# Patient Record
(demographics unavailable — no encounter records)

---

## 2024-10-15 NOTE — HISTORY OF PRESENT ILLNESS
[FreeTextEntry1] : 53-year-old female active smoker with multiple medical problems including congestive heart failure, coronary artery disease, diabetes, morbid obesity with a long history of peripheral vascular disease s/p removal of infected graft and multiple interventions at prior facility who presents to the office for bilateral lower extremity wounds. Patient has completed several courses of antibiotics. Patient reports significant rest pain. She has been seeing wound care weekly.  Patient also has a draining abscess on the right thigh.  This has been present since the graft was removed at [facility].  She states there is still drainage.  Denies fever.

## 2024-10-15 NOTE — HISTORY OF PRESENT ILLNESS
[FreeTextEntry1] : 53-year-old female active smoker with multiple medical problems including congestive heart failure, coronary artery disease, diabetes, morbid obesity with a long history of peripheral vascular disease s/p removal of infected graft and multiple interventions at prior facility who presents to the office for bilateral lower extremity wounds. Patient has completed several courses of antibiotics. Patient reports significant rest pain. She has been seeing wound care weekly.  Patient also has a draining abscess on the right thigh.  This has been present since the graft was removed at [facility].  She states there is still drainage.  Denies fever.

## 2024-10-15 NOTE — ASSESSMENT
[Arterial/Venous Disease] : arterial/venous disease [Medication Management] : medication management [Smoking Cessation] : smoking cessation [Ulcer Care] : ulcer care [FreeTextEntry1] : 53-year-old female with a very complicated history including congestive heart failure coronary artery disease, diabetes, morbid obesity with a long history of peripheral vascular disease and she is status post removal of multiple stents.  In the office she underwent a duplex study which does show continued area of infection that tracks to the level of an old bypass graft.  Given these findings patient will need to have an I&D with removal of the infected graft.  This was discussed with the patient at length.

## 2024-10-15 NOTE — PHYSICAL EXAM
[Normal Breath Sounds] : Normal breath sounds [Normal Rate and Rhythm] : normal rate and rhythm [2+] : left 2+ [1+] : left 1+ [0] : left 0 [Ankle Swelling (On Exam)] : present [Ankle Swelling Bilaterally] : severe [Skin Ulcer] : ulcer [Alert] : alert [Calm] : calm [JVD] : no jugular venous distention  [de-identified] : Appears well, elevated BMI [de-identified] : wound dehiscence right thigh, right heel ulcer 7.4 x 4 cm, left heel ulcer 2 x 1.5 cm

## 2024-10-15 NOTE — PHYSICAL EXAM
[Normal Breath Sounds] : Normal breath sounds [Normal Rate and Rhythm] : normal rate and rhythm [2+] : left 2+ [1+] : left 1+ [0] : left 0 [Ankle Swelling (On Exam)] : present [Ankle Swelling Bilaterally] : severe [Skin Ulcer] : ulcer [Alert] : alert [Calm] : calm [JVD] : no jugular venous distention  [de-identified] : Appears well, elevated BMI [de-identified] : wound dehiscence right thigh, right heel ulcer 7.4 x 4 cm, left heel ulcer 2 x 1.5 cm

## 2024-10-21 NOTE — PHYSICAL EXAM
[0] : left 0 [Ankle Swelling (On Exam)] : present [Ankle Swelling Bilaterally] : severe [Please See PDF for [employer]] : Please See PDF for [employer].

## 2024-10-28 NOTE — HISTORY OF PRESENT ILLNESS
[FreeTextEntry1] : 53F with PMH of HF, CAD, and PVD seen for follow up management of bilateral foot ulcers. She has been applying Santyl and Aquacell to her bilateral foot wounds. PT states she has a nicotine patch to try and stop smoking but they caused a rash and stopped wearing them.   FBS 10/21: 165 mg/dl  7/8/24 HbA1c: 7.9%

## 2024-10-28 NOTE — ASSESSMENT
[FreeTextEntry1] : 53F with R dorsolateral foot wound and b/l heel wounds  - Pt assessed and chart reviewed  - Right foot [id number] open with granular wound bed, clear serous no drainage, periwound erythema, s/p 6/10 right heel; Apligraft application, right leg anterior leg wound was dried sanguineous blood, no signs of infection, b/l healed heel wounds, no signs of acute infection - Using sterile #15 blade, right foot dorsolateral foot wound was debrided to the level of subQ and not beyond, pt tolerated procedure well - Applied Santyl followed by followed by DSD to dorsolateral foot wound and Mupirocin to the right anterior leg wound - Educated pt on importance of smoking cessation for proper wound healing  - RTC 3 weeks

## 2025-04-03 NOTE — REASON FOR VISIT
[Cardiac Failure] : cardiac failure [Other: ____] : [unfilled] [FreeTextEntry1] : Card: Dr. Mariel Ma\par  PCP: Dr. Leno Steinberg\par  Pulmonologist: Dr. Jameel Laughlin\par  Podiatrist Dr. salena Kessler

## 2025-04-03 NOTE — HISTORY OF PRESENT ILLNESS
[FreeTextEntry1] : Ms. Camara is a 54 year old woman with HFpEF (LVIDd 5cm, LVEF 62%), CAD s/p PCIs (2015) with subsequent 1v CABG (2016 at [facility]), HTN, PVD s/p RLE stent in 2020 with subsequent RLE digits amputation, RLE DVT (in 2021, on Xarelto), HLD, DMT2 (Ha1c 8.3%), COPD, MARY ELLEN on CPAP and morbid obesity (BMI 43) who is an active smoker. She presents today for follow up  She was hospitalized from 10/18-10/30/23 at [facility] sent from HF office for volume overload. She was diuresis with IV Bumex for 4 days before transitioning to PO torsemide and on her GDMTs. Pt was also being treated with IV antibiotics for RLE infection complicated by septic arthritis/OM of R 3rd MTP joint. Pt was discharged on 10/30/23 with spironolactone 25mg QD, Toprol XL 50mg QD, torsemide 40mg QD and lisinopril 2.5mg QD and standing weight of 281lb.   Of note, given significant PAD decision made to d/c Jardiance. Not currently following Dr. Crowell as she was dismissed from her office.   Last visit with HF was 5/2024. Took 1 metalozone and had good UOP. Had seen vascular in interim and not a surgical/endovascular candidate at this time given ongoing vascular disease  She presents today endorsing more RLE > LLE swelling and abdominal discomfort. She doesn't walk much and primarily depends on her motor scooter to navigate around. She said she is taking torsemide 60mg BID and sometimes endorses good and sometimes not so much urine output. She states she still eats junk/salty food and actively smokes cigarettes.  Otherwise, denies any CP/palpitation, LH/dizziness, orthopnea or PND.

## 2025-04-03 NOTE — PHYSICAL EXAM
[No Acute Distress] : no acute distress [Normal Conjunctiva] : normal conjunctiva [Soft] : abdomen soft [Non Tender] : non-tender [Normal Bowel Sounds] : normal bowel sounds [No Rash] : no rash [Normal] : moves all extremities, no focal deficits, normal speech [Alert and Oriented] : alert and oriented [de-identified] : obese [de-identified] : partially edentulous [de-identified] : difficult to assess given body habitus and patient unable to get out of motorized wheelchair but appears about ~10cm H2O [de-identified] : expiratory wheeze throughout, unlabored [de-identified] : using electric wheelchair [de-identified] : + 2 RLE edema and trace to LLE. [de-identified] : warm peripherally, dressing to RLE

## 2025-04-03 NOTE — ASSESSMENT
[FreeTextEntry1] : 53 year old F with HFpEF, CAD s/p PCIs (2015) with subsequent 1v CABG (2016 at [facility]), HTN, PVD s/p RLE stent in 2020 with subsequent RLE digits amputation, PAD noted on recent CTA 3/2024, RLE DVT (in 2021, on Xarelto), HLD, DMT2 (Ha1c 8.3%), COPD, MARY ELLEN, morbid obesity (BMI 43) and active smoker who presents today for follow up post hospital discharge. She's currently ACC/AHA Stage C, NYHA Class IIIb, mildly volume up, normotensive on exam.   #Chronic diastolic heart failure - Will continue torsemide to 60mg BID. will add on metalozone 2.5mg weekly to help augment diuresis and maintain eventual euvolemia - Continue spironolactone 50 daily - SGLT2i deferred given PAD - Discussed CardioMEMs, however, currently not an ideal candidate given open wounds, and prior issues with compliance and being able to contact patient. Could reconsider in the future once wounds are healed and patient has demonstrated regular follow up with HF team.  - encouraged daily weights as best able - reeducated on importance of low sodium diet. 1L fluid restriction -encourage to cessation of smoking but she has no interest  # CAD - with known  of RCA and dCx but with good collaterals - no signs of ischemia - on ASA and statin  # MARY ELLEN - currently untreated as has not used CPAP due to anxiety - untreated MARY ELLEN may be contributing to fatigue - continue to follow with pulmonology Dr. Laughlin  # RLE PVD - During hospitalization - treated w/ IV antibiotics for RLE infection complicated by septic arthritis/OM of R 3rd MTP joint - Follow up with vascular and wound care; Urged importance of reaching out to wound care team for further management.   #anxiety and reported PTSD - Recommend psych follow up  #current smoker - extensive discussion regarding importance of smoking cessation and available resources - advised her to follow up with her PCP to discuss further strategies or treatment options - recommended use of nicotine patch  #COPD - reinforced importance of regularly taking maintenance inhalers - recommended she follow up with pulm   Follow up with Dr. Driver in 4 months.

## 2025-04-03 NOTE — CARDIOLOGY SUMMARY
[de-identified] : TTE 10/24/23: Overall not well visualized, nl LV/RV function, nl biatria,   TTE 10/7/22: LVIDd 5 cm, LVEF 62%, concentric LVH (septum 1.3 cm, PW 1.3 cm), normal RV size and function, normal biatria, no valvulopathy, IVC is normal with respiratory variation  [de-identified] : \par  [facility] 12/14/22: 100% dCX well collateralized, 100% RCA well collateralized, patent LIMA to LAD, mild atherosclerosis of LM. No LVEDP\par